# Patient Record
Sex: FEMALE | Race: WHITE | Employment: UNEMPLOYED | URBAN - METROPOLITAN AREA
[De-identification: names, ages, dates, MRNs, and addresses within clinical notes are randomized per-mention and may not be internally consistent; named-entity substitution may affect disease eponyms.]

---

## 2017-05-22 PROBLEM — Z86.32 HX GESTATIONAL DIABETES: Status: ACTIVE | Noted: 2017-05-22

## 2017-05-22 PROBLEM — R79.89 ABNORMAL LIVER FUNCTION TEST: Status: ACTIVE | Noted: 2017-05-22

## 2017-05-22 NOTE — PATIENT INSTRUCTIONS
Check on tetanus shot see if you had one in the last 5- 10 years- if not - then you need one (TDaP)- tetanus, diphtheria, pertussis.        Recheck blood work in 3 months (CBC, iron, TIBC, ferritin) and CMP for liver function (fasting)     Pap obtained with

## 2017-05-23 NOTE — TELEPHONE ENCOUNTER
----- Message from ZACKERY Dickey sent at 5/23/2017 12:02 PM CDT -----  Please notify patient that gonorrhea and chlamydia is negative. Thank you.   Pap and HPV are pending

## 2017-05-23 NOTE — PROGRESS NOTES
HPI:    Patient ID: Chrissy Kirk is a 29year old female. HPI patient is here for her annual physical.  Patient denies complaints, would like to refill her birth control pills. Patient has had the same partner for the last year.   Denies vaginal comp ear and ear canal normal.   Nose: Nose normal.   Mouth/Throat: Oropharynx is clear and moist and mucous membranes are normal. Normal dentition. No oropharyngeal exudate.    Eyes: Conjunctivae and lids are normal. Pupils are equal, round, and reactive to lig normal strength   Lymphadenopathy:     She has no cervical adenopathy. She has no axillary adenopathy. Right: No inguinal and no supraclavicular adenopathy present. Left: No inguinal and no supraclavicular adenopathy present.    Neurologic

## 2017-05-24 NOTE — TELEPHONE ENCOUNTER
----- Message from ZACKERY Mac sent at 5/24/2017 12:08 PM CDT -----  Please notify patient that her Pap smear is within normal limits, and HPV is negative. .  Recommend annual physical, will discuss need for Pap at physical next year.   It does look

## 2017-05-24 NOTE — TELEPHONE ENCOUNTER
Patient notified of results and recommendations, expressed understanding and thanks. Patient states she is not having any symptoms of yeast infection.

## 2017-10-26 NOTE — TELEPHONE ENCOUNTER
----- Message from ZACKERY Colón sent at 10/26/2017  9:00 AM CDT -----  Please notify patient that her blood work shows that her total iron is normal, however her iron saturation is a little low–basically she could have more iron as a backup–please

## 2018-06-11 NOTE — PROGRESS NOTES
HPI:    Patient ID: Vic Henson is a 34year old female. HPI patient is here for her annual physical and Pap, states that she stopped her birth control pills 2 weeks ago, is planning to become pregnant.   Patient has had the pain same partner for the Eyes: Conjunctivae and lids are normal. Pupils are equal, round, and reactive to light. Right eye exhibits no discharge. Left eye exhibits no discharge. Neck: Trachea normal and normal range of motion. Neck supple. No tracheal deviation present.  No thyro Neurological: She is alert and oriented to person, place, and time. She has normal strength. Skin: Skin is warm, dry and intact. No cyanosis. Nails show no clubbing. Psychiatric: She has a normal mood and affect.  Her behavior is normal. Judgment and th

## 2018-06-11 NOTE — PATIENT INSTRUCTIONS
Start prenatal vitamins now, you may continue exercise,  but monitor heart rate. Keep your heart rate below 140. Be cautious not to over heat,  you  want to keep a normal core body temperature.   If you develop a fever you may only use Tylenol over the cou

## 2018-06-12 NOTE — TELEPHONE ENCOUNTER
Patient notified of results and recommendations and expressed understanding.   Patient states she is currently taking iron supplement  Patient advised to stop iron supplement  Patient states she is not taking any calcium supplements  Order for [facility] faxed to

## 2018-06-12 NOTE — TELEPHONE ENCOUNTER
----- Message from ZACKERY Contreras sent at 6/12/2018  1:25 PM CDT -----  Please notify patient that her hemoglobin is good, she is not anemic. Her iron is actually a little bit high.   If she is taking extra iron on top of a multivitamin, she can stop

## 2018-06-13 NOTE — TELEPHONE ENCOUNTER
----- Message from ZACKERY Gillette sent at 6/13/2018 12:19 PM CDT -----  Please notify patient that her Pap smear is within normal limits, her HPV is negative. Recommend annual physical, will discuss need for Pap at physical next year.   (Patient was

## 2018-07-21 NOTE — TELEPHONE ENCOUNTER
----- Message from Jerald Gonzalez MD sent at 7/21/2018 11:21 AM CDT -----  Della's patient.  Please inform patient that PTH is normal.

## 2019-06-12 NOTE — PATIENT INSTRUCTIONS
Follow up for fasting blood work and B 12     Recommend HPV vaccine - Gardasil-9 -  Series of 3 first, then #2 in 2 months, then #3 in 6 months.      Tetanus shot today (TDaP)    Pap and HPV today     Start prenatal vitamins now, you may continue exercise,

## 2019-06-12 NOTE — PROGRESS NOTES
HPI:    Patient ID: Gregor Miranda is a 30year old female. HPI patient is here for her annual physical.  Patient states that overall she feels well–states her menses have been slightly irregular.   States she still gets one every month, but sometimes is normal range of motion. Neck supple. No tracheal deviation present. No thyroid mass and no thyromegaly present. Cardiovascular: Normal rate, regular rhythm, S1 normal, S2 normal, normal heart sounds, intact distal pulses and normal pulses.    No murmur he affect. Her behavior is normal. Judgment and thought content normal.   Nursing note and vitals reviewed.              ASSESSMENT/PLAN:   Encounter for health maintenance examination in adult  (primary encounter diagnosis)  Encounter for gynecological examin

## 2019-06-17 NOTE — TELEPHONE ENCOUNTER
----- Message from WILMER Jackson sent at 6/17/2019  2:13 PM CDT -----  Please notify patient that her Pap smear is within normal limits. Recommend annual physical, will discuss need for Pap at physical next year. Thank you.

## 2019-07-03 NOTE — TELEPHONE ENCOUNTER
Informed pt of her blood work results. Iron and TIBC was also added to blood work. Pt expressed understanding and will call with iron returns when they come in. Pt will also watch her diet.

## 2019-07-03 NOTE — TELEPHONE ENCOUNTER
----- Message from WILMER sIlas sent at 7/3/2019 12:17 PM CDT -----  Please notify patient that her CBC shows that she is slightly anemic–it looks to be an iron deficiency –order to  add iron profile .   Her B12 level is normal.  Her cholesterol is

## 2020-06-23 NOTE — PATIENT INSTRUCTIONS
Pap obtained today with HPV test - results will be back in a few days. Follow up for fasting blood work at [employer] on [employer] loss, health diet high in vegetables and lean proteins, and aim for a waist circumference of 35 inches or less.

## 2020-06-23 NOTE — PROGRESS NOTES
HPI:    Patient ID: Crow Rojo is a 37year old female. HPI patient presents today for her annual physical–states overall she feels well.   Patient states she is no longer taking oral contraceptive pill–she would like to become pregnant in the future and 2+ on the left side. Posterior tibial pulses are 2+ on the right side and 2+ on the left side.    Pulmonary/Chest: Effort normal and breath sounds normal. Right breast exhibits no inverted nipple, no mass, no nipple discharge, no skin change and Thin Prep Collect      CBC W Differential W Platelet [E]      Comp Metabolic Panel (14) [E]      T4 FREE [866] [Q]      TSH [899] [Q]      Vitamin B12 [E]      Vitamin D, 25-Hydroxy [E]      Lipid Panel [E]      SARS-CoV-2 IGG ANTIBODY (COVID) [33850][Q]

## 2020-06-25 NOTE — TELEPHONE ENCOUNTER
----- Message from WILMER Hughes sent at 6/25/2020  4:22 PM CDT -----  Please notify patient that her Pap smear is within normal limits and HPV is negative. Recommend annual physical, will discuss need for Pap at physical next year. Thank you.

## 2020-06-29 NOTE — TELEPHONE ENCOUNTER
----- Message from WILMER Bartholomew sent at 6/29/2020  1:22 PM CDT -----  Please notify patient that her blood work shows that she is anemic. Her hemoglobin is 10. 2–her CBC looks like iron deficiency.   Please call [facility] to see if iron tests can be ad

## 2020-06-29 NOTE — TELEPHONE ENCOUNTER
Called to Hermes Smith with [facility]. Labs added as requested. Patient informed of the below results and recommendations. Patient will await further recommendations once add on testing results are available.

## 2020-06-30 NOTE — TELEPHONE ENCOUNTER
Pt notified and verbalized understanding. Orders faxed to [facility] at [phone].

## 2020-06-30 NOTE — TELEPHONE ENCOUNTER
----- Message from WILMER Serna sent at 6/30/2020 11:03 AM CDT -----  Please notify patient that her iron is low. This is the cause of her anemia.   I recommend iron supplement–prescription for Feosol 325 mg 1 by mouth in the morning with breakfas

## 2020-08-14 NOTE — TELEPHONE ENCOUNTER
Received letter regarding overdue lab orders. Patient said she was not due for the labs until the end of September. She will give [facility] a call to schedule that appt for then.

## 2021-06-24 NOTE — PROGRESS NOTES
HPI/Subjective:   Patient ID: Andres Ballesteros is a 31year old female. HPI patient presents the office today for her annual physical.  Patient states she feels well overall  Chart review shows history of low iron, low vitamin D, and gestational diabetes. Dentition: Normal dentition. Pharynx: No oropharyngeal exudate. Eyes:      General: Lids are normal.         Right eye: No discharge. Left eye: No discharge.       Conjunctiva/sclera: Conjunctivae normal.      Pupils: Pupils are equal, round, adenopathy. Left upper body: No supraclavicular adenopathy. Skin:     General: Skin is warm and dry. Nails: There is no clubbing. Neurological:      Mental Status: She is alert and oriented to person, place, and time.    Psychiatric:         A severe cramping and/or bleeding. Call OB/GYN office to schedule an appointment for further care.

## 2021-06-24 NOTE — PATIENT INSTRUCTIONS
Follow-up for fasting blood work - [facility]     Pap smear and HPV test obtained today–results be back within a week and will notify you. Start prenatal vitamins now, you may continue exercise,  but monitor heart rate. Keep your heart rate below 140.  Be ca

## 2021-06-29 NOTE — TELEPHONE ENCOUNTER
----- Message from WILMER Johnson sent at 6/29/2021  3:18 PM CDT -----  Please make sure patient receives my chart message as below  Your Pap smear is normal and your HPV is negative.   Recommend an annual physical and we can discuss need for the Pap

## 2022-11-10 NOTE — TELEPHONE ENCOUNTER
Had a baby on 22.  Daughter see's Dr. Karyn Tomas and wants to know if Dr. Karyn Tomas will see her  also

## 2023-04-10 ENCOUNTER — OFFICE VISIT (OUTPATIENT)
Dept: FAMILY MEDICINE CLINIC | Facility: CLINIC | Age: 38
End: 2023-04-10
Payer: COMMERCIAL

## 2023-04-10 VITALS
SYSTOLIC BLOOD PRESSURE: 110 MMHG | OXYGEN SATURATION: 98 % | HEART RATE: 87 BPM | RESPIRATION RATE: 18 BRPM | TEMPERATURE: 98 F | HEIGHT: 63 IN | WEIGHT: 236 LBS | BODY MASS INDEX: 41.82 KG/M2 | DIASTOLIC BLOOD PRESSURE: 72 MMHG

## 2023-04-10 DIAGNOSIS — M25.532 PAIN IN BOTH WRISTS: Primary | ICD-10-CM

## 2023-04-10 DIAGNOSIS — Z86.32 HX GESTATIONAL DIABETES: ICD-10-CM

## 2023-04-10 DIAGNOSIS — G56.03 BILATERAL CARPAL TUNNEL SYNDROME: ICD-10-CM

## 2023-04-10 DIAGNOSIS — R20.2 PARESTHESIA: ICD-10-CM

## 2023-04-10 DIAGNOSIS — D50.9 IRON DEFICIENCY ANEMIA, UNSPECIFIED IRON DEFICIENCY ANEMIA TYPE: ICD-10-CM

## 2023-04-10 DIAGNOSIS — M25.531 PAIN IN BOTH WRISTS: Primary | ICD-10-CM

## 2023-04-10 PROCEDURE — 3074F SYST BP LT 130 MM HG: CPT | Performed by: FAMILY MEDICINE

## 2023-04-10 PROCEDURE — 3051F HG A1C>EQUAL 7.0%<8.0%: CPT | Performed by: FAMILY MEDICINE

## 2023-04-10 PROCEDURE — 3008F BODY MASS INDEX DOCD: CPT | Performed by: FAMILY MEDICINE

## 2023-04-10 PROCEDURE — 99214 OFFICE O/P EST MOD 30 MIN: CPT | Performed by: FAMILY MEDICINE

## 2023-04-10 PROCEDURE — 3078F DIAST BP <80 MM HG: CPT | Performed by: FAMILY MEDICINE

## 2023-04-10 RX ORDER — IBUPROFEN 600 MG/1
TABLET ORAL
COMMUNITY
Start: 2022-11-09 | End: 2023-04-10

## 2023-04-10 RX ORDER — IBUPROFEN 200 MG
400 TABLET ORAL 2 TIMES DAILY
COMMUNITY
Start: 2023-04-10

## 2023-04-10 NOTE — PATIENT INSTRUCTIONS
Rec wrist braces bilaterally    Rec fasting labs    Rec ibuprophen 2 tab am and pm with food    Rec annual check  in future

## 2023-04-11 ENCOUNTER — TELEPHONE (OUTPATIENT)
Dept: FAMILY MEDICINE CLINIC | Facility: CLINIC | Age: 38
End: 2023-04-11

## 2023-04-11 DIAGNOSIS — Z86.32 HISTORY OF GESTATIONAL DIABETES: ICD-10-CM

## 2023-04-11 DIAGNOSIS — E11.9 NEW ONSET TYPE 2 DIABETES MELLITUS (HCC): Primary | ICD-10-CM

## 2023-04-11 DIAGNOSIS — R74.8 ELEVATED LIVER ENZYMES: Primary | ICD-10-CM

## 2023-04-11 NOTE — TELEPHONE ENCOUNTER
Pt contacted-  All recommendations reviewed with pt. Pt agreed to call diabetes education for appt. Referral order pended. Pt agreed to add iron daily. Pt scheduled appt for ultrasound. [facility] contacted-  Added Hepatitis panel- pending. Pt mentioned she was testing four times a day when she was pregnant-   Please advise on frequency in checking blood sugars at this time. F/u appt scheduled 4/18/23.     Future Appointments   Date Time Provider Brandon Servin   4/12/2023 10:30 AM SY US RM 1 SY US [place]   4/18/2023 10:00 AM Neris Bacon MD [facility] [place] [facility]

## 2023-04-11 NOTE — TELEPHONE ENCOUNTER
Pt informed. Referral with lab report faxed to [place] to diabetes education. Pt agreed to call for appt. Pt agreed to recommendations given.

## 2023-04-11 NOTE — TELEPHONE ENCOUNTER
Referral to diabetic education faxed today. Spoke with Filemon Culp at [facility] diabetic education office- referral was received. Filemon Culp stated she would call pt to schedule appt.

## 2023-04-11 NOTE — TELEPHONE ENCOUNTER
----- Message from Rachael Burch MD sent at 4/11/2023  8:39 AM CDT -----  Laboratory results reviewed. Laboratories showing CBC abnormal with slight anemia hemoglobin of 11. 1. Iron panel with low iron and iron saturation. Chemistry profile shows glucose elevated at 108. Kidney function normal.  Electrolytes normal.  Liver enzymes elevated. Lipid profile showing slight elevation of total cholesterol at 213 triglycerides 157 LDL cholesterol 137. Patient recommended iron supplement 325 mg daily. Hemoglobin A1c 7.3 elevated consistent with diabetes. Urinalysis with a few skin cells otherwise negative. Thyroid function shows decreased TSH but free T4. B12 is normal.    Patient recommended referral to diabetic education and appointment for follow-up to discuss diagnosis and treatment of diabetes. Patient to restart checking home glucose as she did during  previous pregnancy. Noted liver enzymes to be further evaluated with liver ultrasound and hepatitis blood panel. Patient to avoid alcohol and Tylenol usage. Patient appointment advised to discuss lab results and treatments. Results forwarded to patient via [employer] system. Patient encouraged to call for any questions or concerns.

## 2023-04-12 ENCOUNTER — HOSPITAL ENCOUNTER (OUTPATIENT)
Dept: ULTRASOUND IMAGING | Age: 38
Discharge: HOME | End: 2023-04-12
Attending: FAMILY MEDICINE
Payer: COMMERCIAL

## 2023-04-12 ENCOUNTER — TELEPHONE (OUTPATIENT)
Dept: FAMILY MEDICINE CLINIC | Facility: CLINIC | Age: 38
End: 2023-04-12

## 2023-04-12 DIAGNOSIS — R74.8 ELEVATED LIVER ENZYMES: ICD-10-CM

## 2023-04-12 LAB
% SATURATION: 8 % (CALC) (ref 16–45)
ABSOLUTE BASOPHILS: 40 CELLS/UL (ref 0–200)
ABSOLUTE EOSINOPHILS: 240 CELLS/UL (ref 15–500)
ABSOLUTE LYMPHOCYTES: 1912 CELLS/UL (ref 850–3900)
ABSOLUTE MONOCYTES: 592 CELLS/UL (ref 200–950)
ABSOLUTE NEUTROPHILS: 5216 CELLS/UL (ref 1500–7800)
ALBUMIN/GLOBULIN RATIO: 1.5 (CALC) (ref 1–2.5)
ALBUMIN: 4.5 G/DL (ref 3.6–5.1)
ALKALINE PHOSPHATASE: 99 U/L (ref 31–125)
ALT: 145 U/L (ref 6–29)
AST: 166 U/L (ref 10–30)
BASOPHILS: 0.5 %
BILIRUBIN, TOTAL: 0.8 MG/DL (ref 0.2–1.2)
BILIRUBIN: NEGATIVE
BUN: 9 MG/DL (ref 7–25)
CALCIUM: 9.6 MG/DL (ref 8.6–10.2)
CARBON DIOXIDE: 29 MMOL/L (ref 20–32)
CHLORIDE: 100 MMOL/L (ref 98–110)
CHOL/HDLC RATIO: 4.5 (CALC)
CHOLESTEROL, TOTAL: 213 MG/DL
COLOR: YELLOW
CREATININE: 0.65 MG/DL (ref 0.5–0.97)
EGFR: 117 ML/MIN/1.73M2
EOSINOPHILS: 3 %
FERRITIN: 30 NG/ML (ref 16–154)
GLOBULIN: 3.1 G/DL (CALC) (ref 1.9–3.7)
GLUCOSE: 108 MG/DL (ref 65–99)
GLUCOSE: NEGATIVE
HDL CHOLESTEROL: 47 MG/DL
HEMATOCRIT: 33.8 % (ref 35–45)
HEMOGLOBIN A1C: 7.3 % OF TOTAL HGB
HEMOGLOBIN: 11.1 G/DL (ref 11.7–15.5)
IRON BINDING CAPACITY: 461 MCG/DL (CALC) (ref 250–450)
IRON, TOTAL: 37 MCG/DL (ref 40–190)
KETONES: NEGATIVE
LDL-CHOLESTEROL: 137 MG/DL (CALC)
LEUKOCYTE ESTERASE: NEGATIVE
LYMPHOCYTES: 23.9 %
MCH: 26.7 PG (ref 27–33)
MCHC: 32.8 G/DL (ref 32–36)
MCV: 81.4 FL (ref 80–100)
MONOCYTES: 7.4 %
MPV: 9.8 FL (ref 7.5–12.5)
NEUTROPHILS: 65.2 %
NITRITE: NEGATIVE
NON-HDL CHOLESTEROL: 166 MG/DL (CALC)
OCCULT BLOOD: NEGATIVE
PH: 5.5 (ref 5–8)
PLATELET COUNT: 383 THOUSAND/UL (ref 140–400)
POTASSIUM: 4 MMOL/L (ref 3.5–5.3)
PROTEIN, TOTAL: 7.6 G/DL (ref 6.1–8.1)
PROTEIN: NEGATIVE
RDW: 13.9 % (ref 11–15)
RED BLOOD CELL COUNT: 4.15 MILLION/UL (ref 3.8–5.1)
SIGNAL TO CUT-OFF: 0.04
SODIUM: 138 MMOL/L (ref 135–146)
SPECIFIC GRAVITY: 1.02 (ref 1–1.03)
T4, FREE: 1 NG/DL (ref 0.8–1.8)
TRIGLYCERIDES: 157 MG/DL
TSH: 0.37 MIU/L
VITAMIN B12: 1072 PG/ML (ref 200–1100)
WHITE BLOOD CELL COUNT: 8 THOUSAND/UL (ref 3.8–10.8)

## 2023-04-12 PROCEDURE — 76700 US EXAM ABDOM COMPLETE: CPT | Performed by: FAMILY MEDICINE

## 2023-04-12 NOTE — TELEPHONE ENCOUNTER
----- Message from Leala Najjar, MD sent at 4/12/2023  2:33 PM CDT -----  Renal ultrasound shows fatty deposit changes of the liver. Also distended gallbladder. Further evaluation of gallbladder pending information regarding patient's dietary habits and the 12 hours prior to the ultrasound. To review and discuss with her further at upcoming appointment. Results forwarded to patient via [employer] system. Patient encouraged to call for any questions or concerns.

## 2023-04-12 NOTE — TELEPHONE ENCOUNTER
Dictated comments regarding abdominal ultrasound have dictation error in the dictation:      Report should reference abdominal ultrasound showing fatty deposits in her liver. Patient's gallbladder is also distended. Distention of the gallbladder may be related to patient being fasting versus gallbladder dysfunction. Please check with patient regarding when she ate prior to ultrasound being performed. We will discuss ultrasound results further with patient at upcoming appointment. Further evaluation of gallbladder may or may not be needed.

## 2023-04-12 NOTE — TELEPHONE ENCOUNTER
Pt states she ate cereal at 4am and had ultrasound at 10:30am.    Pt will await further discussion at upcoming appt next week.

## 2023-04-18 ENCOUNTER — OFFICE VISIT (OUTPATIENT)
Dept: FAMILY MEDICINE CLINIC | Facility: CLINIC | Age: 38
End: 2023-04-18
Payer: COMMERCIAL

## 2023-04-18 VITALS
TEMPERATURE: 97 F | HEART RATE: 90 BPM | BODY MASS INDEX: 41.12 KG/M2 | OXYGEN SATURATION: 97 % | RESPIRATION RATE: 16 BRPM | HEIGHT: 63.5 IN | DIASTOLIC BLOOD PRESSURE: 76 MMHG | SYSTOLIC BLOOD PRESSURE: 114 MMHG | WEIGHT: 235 LBS

## 2023-04-18 DIAGNOSIS — R93.5 ABNORMAL ABDOMINAL ULTRASOUND: ICD-10-CM

## 2023-04-18 DIAGNOSIS — G56.03 BILATERAL CARPAL TUNNEL SYNDROME: ICD-10-CM

## 2023-04-18 DIAGNOSIS — E11.9 NEW ONSET TYPE 2 DIABETES MELLITUS (HCC): Primary | ICD-10-CM

## 2023-04-18 DIAGNOSIS — R79.89 ABNORMAL LIVER FUNCTION TEST: ICD-10-CM

## 2023-04-18 DIAGNOSIS — D50.9 IRON DEFICIENCY ANEMIA, UNSPECIFIED IRON DEFICIENCY ANEMIA TYPE: ICD-10-CM

## 2023-04-18 PROBLEM — Z86.32 HX GESTATIONAL DIABETES: Status: RESOLVED | Noted: 2017-05-22 | Resolved: 2023-04-18

## 2023-04-18 PROCEDURE — 3008F BODY MASS INDEX DOCD: CPT | Performed by: FAMILY MEDICINE

## 2023-04-18 PROCEDURE — 99214 OFFICE O/P EST MOD 30 MIN: CPT | Performed by: FAMILY MEDICINE

## 2023-04-18 PROCEDURE — 3074F SYST BP LT 130 MM HG: CPT | Performed by: FAMILY MEDICINE

## 2023-04-18 PROCEDURE — 3078F DIAST BP <80 MM HG: CPT | Performed by: FAMILY MEDICINE

## 2023-04-18 NOTE — PATIENT INSTRUCTIONS
Rec continue monitor sugars, diet and goal weight loss    Recheck labs 3 months    Check HIDDA scan - eval gallbladder    Start treatment diabetes    Recheck 6 weeks    Monitor thyroid and cholesterol

## 2023-04-28 ENCOUNTER — TELEPHONE (OUTPATIENT)
Dept: FAMILY MEDICINE CLINIC | Facility: CLINIC | Age: 38
End: 2023-04-28

## 2023-04-28 NOTE — TELEPHONE ENCOUNTER
GB scan completed at [facility] on 4/27/23  Report was reviewed per CR-  Reported as being negative. Pt to follow up if concerns,  appt noted on file    Pt was informed. Pt agreed to f/u at appt.     Future Appointments   Date Time Provider Brandon Servin   5/31/2023 11:00 AM Stephen Jaimes MD [facility] [place] [facility] [place]

## 2023-05-31 ENCOUNTER — OFFICE VISIT (OUTPATIENT)
Dept: FAMILY MEDICINE CLINIC | Facility: CLINIC | Age: 38
End: 2023-05-31
Payer: COMMERCIAL

## 2023-05-31 VITALS
BODY MASS INDEX: 40.42 KG/M2 | SYSTOLIC BLOOD PRESSURE: 118 MMHG | TEMPERATURE: 98 F | WEIGHT: 231 LBS | HEIGHT: 63.5 IN | RESPIRATION RATE: 16 BRPM | OXYGEN SATURATION: 98 % | DIASTOLIC BLOOD PRESSURE: 82 MMHG | HEART RATE: 92 BPM

## 2023-05-31 DIAGNOSIS — E11.9 NEW ONSET TYPE 2 DIABETES MELLITUS (HCC): Primary | ICD-10-CM

## 2023-05-31 DIAGNOSIS — E55.9 VITAMIN D DEFICIENCY: ICD-10-CM

## 2023-05-31 DIAGNOSIS — R79.89 ABNORMAL LIVER FUNCTION TEST: ICD-10-CM

## 2023-05-31 DIAGNOSIS — E53.8 VITAMIN B 12 DEFICIENCY: ICD-10-CM

## 2023-05-31 DIAGNOSIS — R20.2 PARESTHESIA: ICD-10-CM

## 2023-05-31 DIAGNOSIS — Z00.00 ENCOUNTER FOR HEALTH MAINTENANCE EXAMINATION IN ADULT: ICD-10-CM

## 2023-05-31 DIAGNOSIS — G56.03 BILATERAL CARPAL TUNNEL SYNDROME: ICD-10-CM

## 2023-05-31 PROCEDURE — 99214 OFFICE O/P EST MOD 30 MIN: CPT | Performed by: FAMILY MEDICINE

## 2023-05-31 PROCEDURE — 3008F BODY MASS INDEX DOCD: CPT | Performed by: FAMILY MEDICINE

## 2023-05-31 PROCEDURE — 3079F DIAST BP 80-89 MM HG: CPT | Performed by: FAMILY MEDICINE

## 2023-05-31 PROCEDURE — 3074F SYST BP LT 130 MM HG: CPT | Performed by: FAMILY MEDICINE

## 2023-05-31 RX ORDER — ACETAMINOPHEN AND CODEINE PHOSPHATE 120; 12 MG/5ML; MG/5ML
0.35 SOLUTION ORAL DAILY
COMMUNITY
Start: 2023-05-12

## 2023-05-31 NOTE — PATIENT INSTRUCTIONS
Rec yearly eye exam    Rec fasting labs in July    Appt to follow for \" physical\"  Continue plan to see gyne    Increase metformin to 500mg in am, 1000 in pm    Consider pneumonia vaccine

## 2023-06-01 PROBLEM — M25.532 PAIN IN BOTH WRISTS: Status: RESOLVED | Noted: 2023-04-10 | Resolved: 2023-06-01

## 2023-06-01 PROBLEM — M25.531 PAIN IN BOTH WRISTS: Status: RESOLVED | Noted: 2023-04-10 | Resolved: 2023-06-01

## 2023-06-01 PROBLEM — R20.2 PARESTHESIA: Status: RESOLVED | Noted: 2023-04-10 | Resolved: 2023-06-01

## 2023-06-02 ENCOUNTER — TELEPHONE (OUTPATIENT)
Dept: FAMILY MEDICINE CLINIC | Facility: CLINIC | Age: 38
End: 2023-06-02

## 2023-07-19 PROCEDURE — 3061F NEG MICROALBUMINURIA REV: CPT | Performed by: NURSE PRACTITIONER

## 2023-07-19 PROCEDURE — 3044F HG A1C LEVEL LT 7.0%: CPT | Performed by: NURSE PRACTITIONER

## 2023-07-20 ENCOUNTER — OFFICE VISIT (OUTPATIENT)
Dept: FAMILY MEDICINE CLINIC | Facility: CLINIC | Age: 38
End: 2023-07-20
Payer: COMMERCIAL

## 2023-07-20 VITALS
RESPIRATION RATE: 16 BRPM | WEIGHT: 225.38 LBS | TEMPERATURE: 98 F | OXYGEN SATURATION: 98 % | DIASTOLIC BLOOD PRESSURE: 74 MMHG | SYSTOLIC BLOOD PRESSURE: 112 MMHG | HEART RATE: 81 BPM | BODY MASS INDEX: 39.44 KG/M2 | HEIGHT: 63.5 IN

## 2023-07-20 DIAGNOSIS — M79.605 LEFT LEG PAIN: Primary | ICD-10-CM

## 2023-07-20 LAB
ABSOLUTE BASOPHILS: 40 CELLS/UL (ref 0–200)
ABSOLUTE EOSINOPHILS: 248 CELLS/UL (ref 15–500)
ABSOLUTE LYMPHOCYTES: 2336 CELLS/UL (ref 850–3900)
ABSOLUTE MONOCYTES: 693 CELLS/UL (ref 200–950)
ABSOLUTE NEUTROPHILS: 6584 CELLS/UL (ref 1500–7800)
ALBUMIN/GLOBULIN RATIO: 1.4 (CALC) (ref 1–2.5)
ALBUMIN: 4.5 G/DL (ref 3.6–5.1)
ALKALINE PHOSPHATASE: 73 U/L (ref 31–125)
ALT: 145 U/L (ref 6–29)
AST: 115 U/L (ref 10–30)
BASOPHILS: 0.4 %
BILIRUBIN, TOTAL: 0.6 MG/DL (ref 0.2–1.2)
BILIRUBIN: NEGATIVE
BUN: 9 MG/DL (ref 7–25)
CALCIUM: 9.9 MG/DL (ref 8.6–10.2)
CARBON DIOXIDE: 26 MMOL/L (ref 20–32)
CHLORIDE: 102 MMOL/L (ref 98–110)
CHOL/HDLC RATIO: 4.4 (CALC)
CHOLESTEROL, TOTAL: 195 MG/DL
CREATININE, RANDOM URINE: 360 MG/DL (ref 20–275)
CREATININE: 0.73 MG/DL (ref 0.5–0.97)
EGFR: 109 ML/MIN/1.73M2
EOSINOPHILS: 2.5 %
GLOBULIN: 3.2 G/DL (CALC) (ref 1.9–3.7)
GLUCOSE: 94 MG/DL (ref 65–99)
GLUCOSE: NEGATIVE
HDL CHOLESTEROL: 44 MG/DL
HEMATOCRIT: 36.2 % (ref 35–45)
HEMOGLOBIN A1C: 6.5 % OF TOTAL HGB
HEMOGLOBIN: 11.6 G/DL (ref 11.7–15.5)
KETONES: NEGATIVE
LDL-CHOLESTEROL: 123 MG/DL (CALC)
LEUKOCYTE ESTERASE: NEGATIVE
LYMPHOCYTES: 23.6 %
MCH: 25.6 PG (ref 27–33)
MCHC: 32 G/DL (ref 32–36)
MCV: 79.7 FL (ref 80–100)
MICROALBUMIN/CREATININE RATIO, RANDOM URINE: 8 MCG/MG CREAT
MICROALBUMIN: 2.8 MG/DL
MONOCYTES: 7 %
MPV: 10 FL (ref 7.5–12.5)
NEUTROPHILS: 66.5 %
NITRITE: NEGATIVE
NON-HDL CHOLESTEROL: 151 MG/DL (CALC)
PH: 5.5 (ref 5–8)
PLATELET COUNT: 345 THOUSAND/UL (ref 140–400)
POTASSIUM: 4.9 MMOL/L (ref 3.5–5.3)
PROTEIN, TOTAL: 7.7 G/DL (ref 6.1–8.1)
RDW: 14.8 % (ref 11–15)
RED BLOOD CELL COUNT: 4.54 MILLION/UL (ref 3.8–5.1)
SODIUM: 139 MMOL/L (ref 135–146)
SPECIFIC GRAVITY: 1.03 (ref 1–1.03)
TRIGLYCERIDES: 161 MG/DL
TSH W/REFLEX TO FT4: 0.41 MIU/L
VITAMIN B12: 702 PG/ML (ref 200–1100)
VITAMIN D, 25-OH, TOTAL: 32 NG/ML (ref 30–100)
WHITE BLOOD CELL COUNT: 9.9 THOUSAND/UL (ref 3.8–10.8)

## 2023-07-20 PROCEDURE — 3008F BODY MASS INDEX DOCD: CPT | Performed by: NURSE PRACTITIONER

## 2023-07-20 PROCEDURE — 99213 OFFICE O/P EST LOW 20 MIN: CPT | Performed by: NURSE PRACTITIONER

## 2023-07-20 PROCEDURE — 3074F SYST BP LT 130 MM HG: CPT | Performed by: NURSE PRACTITIONER

## 2023-07-20 PROCEDURE — 3078F DIAST BP <80 MM HG: CPT | Performed by: NURSE PRACTITIONER

## 2023-07-20 RX ORDER — NAPROXEN 500 MG/1
500 TABLET ORAL 2 TIMES DAILY WITH MEALS
Qty: 50 TABLET | Refills: 0 | Status: SHIPPED | OUTPATIENT
Start: 2023-07-20

## 2023-07-20 NOTE — PATIENT INSTRUCTIONS
Warm, moist compresses for 15 minutes three times a day     No climbing ladders for the next week. Take naproxen with good. Follow up if not improving or worsens.

## 2023-07-24 ENCOUNTER — TELEPHONE (OUTPATIENT)
Dept: FAMILY MEDICINE CLINIC | Facility: CLINIC | Age: 38
End: 2023-07-24

## 2023-07-24 NOTE — TELEPHONE ENCOUNTER
Called [facility]- not able to add Iron/TIBC or Ferritin (out of stability range per [facility] representative)    Pt contacted. Pt has not taken any iron supplement recently. Pt is not interested in hepatology referral at this time. Pt mentioned she was seen recently and given Naproxen for leg pain- pt has been taking BID. Pt was seen on 7/20 per CS  Pt asked what she can take.

## 2023-07-24 NOTE — TELEPHONE ENCOUNTER
----- Message from WILMER Valdes sent at 7/24/2023 12:09 PM CDT -----  Please call [facility] to add total iron and ferritin to existing blood work-orders entered. Please make sure patient receives my chart message as below-     Dr. Kayla Collazo is out of the office-I reviewed your blood work. Your hemoglobin is still slightly low at 11.6 (normal is 11.7-15.5)-add iron to your blood work as it looks to be an iron deficiency anemia are you taking any iron supplements? Your liver function is elevated-although, it is improved from previous. Reviewed ultrasound of your liver shows fatty liver infiltrate. I recommend diet high in vegetables and lean proteins low in carbohydrates and sugars. I also  recommend regular exercise and a goal of a  waist circumference of 35 inches or less at the [facility]. -Recommend repeating labs in 3 months-consider hepatology referral.  Your kidney function is normal, cholesterol is at the top of normal at 195-this is improved from previous when it was 213. HDLs (good cholesterol) is a little low at 44, LDLs (bad cholesterol) is a little high at 123-slightly improved from previous at 137, triglycerides are high at 161-slightly worse than previous at 157. Your hemoglobin A1c has improved it is now 6.5-previously was 7. 3-this means your blood sugars are in control. Your urine creatinine is slightly elevated-microalbumin is normal-make sure you are drinking plenty of fluids and avoiding ibuprofen and Aleve. Thyroid is normal, vitamin D is low normal at 32-recommend increasing vitamin D supplement by 2000 units daily (do not take more than 5000 units daily).   Vitamin B12 is normal.  Thank you,   WILMER Valdes, FNP-BC

## 2023-07-24 NOTE — TELEPHONE ENCOUNTER
Please let patient know there were not able to add iron labs-would recommend she has a blood draw at [address] [place] [address] for iron. As far as the naproxen-she should try to get off of this as soon as possible as it is filtered through her kidneys. Typically, we would recommend Tylenol, however, that goes through the liver. Would recommend trying to get off pain medicines as soon as possible.

## 2023-07-25 RX ORDER — MULTIVIT-MIN/IRON/FOLIC ACID/K 18-600-40
2000 CAPSULE ORAL DAILY
COMMUNITY

## 2023-08-11 ENCOUNTER — OFFICE VISIT (OUTPATIENT)
Dept: FAMILY MEDICINE CLINIC | Facility: CLINIC | Age: 38
End: 2023-08-11
Payer: COMMERCIAL

## 2023-08-11 VITALS
RESPIRATION RATE: 18 BRPM | HEIGHT: 64.2 IN | DIASTOLIC BLOOD PRESSURE: 78 MMHG | SYSTOLIC BLOOD PRESSURE: 118 MMHG | OXYGEN SATURATION: 99 % | WEIGHT: 224.63 LBS | HEART RATE: 94 BPM | BODY MASS INDEX: 38.35 KG/M2 | TEMPERATURE: 98 F

## 2023-08-11 DIAGNOSIS — E11.9 CONTROLLED TYPE 2 DIABETES MELLITUS WITHOUT COMPLICATION, WITHOUT LONG-TERM CURRENT USE OF INSULIN (HCC): ICD-10-CM

## 2023-08-11 DIAGNOSIS — S76.212D STRAIN OF GROIN, LEFT, SUBSEQUENT ENCOUNTER: ICD-10-CM

## 2023-08-11 DIAGNOSIS — D50.9 IRON DEFICIENCY ANEMIA, UNSPECIFIED IRON DEFICIENCY ANEMIA TYPE: ICD-10-CM

## 2023-08-11 DIAGNOSIS — R79.89 ABNORMAL LIVER FUNCTION TEST: ICD-10-CM

## 2023-08-11 DIAGNOSIS — Z00.00 ANNUAL PHYSICAL EXAM: Primary | ICD-10-CM

## 2023-08-11 PROBLEM — G56.03 BILATERAL CARPAL TUNNEL SYNDROME: Status: RESOLVED | Noted: 2023-04-10 | Resolved: 2023-08-11

## 2023-08-11 PROCEDURE — 3078F DIAST BP <80 MM HG: CPT | Performed by: FAMILY MEDICINE

## 2023-08-11 PROCEDURE — 3074F SYST BP LT 130 MM HG: CPT | Performed by: FAMILY MEDICINE

## 2023-08-11 PROCEDURE — 99395 PREV VISIT EST AGE 18-39: CPT | Performed by: FAMILY MEDICINE

## 2023-08-11 PROCEDURE — 99213 OFFICE O/P EST LOW 20 MIN: CPT | Performed by: FAMILY MEDICINE

## 2023-08-11 PROCEDURE — 3008F BODY MASS INDEX DOCD: CPT | Performed by: FAMILY MEDICINE

## 2023-08-11 NOTE — PATIENT INSTRUCTIONS
Rec pneumoia vaccine in future (post pregnancy)      Ob/ gyne care- to continue    Continue diabetic care endocrine    Rec PT for left thigh/ groin strain    Rec daily MVI with iron    Rec heart healthy diet    Tylenol- no more than 1000mg 2 x a day-- ok of rless  Rare use naproxen for severe pain- with food and water

## 2023-09-11 NOTE — TELEPHONE ENCOUNTER
Last Refill:5/31/2023 #135 with 1 Refill   Last Px: 8/11/2023     Future appt:    Last Appointment with provider:   8/11/2023  Last appointment at [facility]:  8/11/2023  CHOLESTEROL, TOTAL (mg/dL)   Date Value   07/19/2023 195     HDL CHOLESTEROL (mg/dL)   Date Value   07/19/2023 44 (L)     LDL-CHOLESTEROL (mg/dL (calc))   Date Value   07/19/2023 123 (H)     TRIGLYCERIDES (mg/dL)   Date Value   07/19/2023 161 (H)     Lab Results   Component Value Date    A1C 6.5 (H) 07/19/2023     Lab Results   Component Value Date    T4F 1.0 04/10/2023    TSH 0.37 (L) 04/10/2023    TSHT4 0.41 07/19/2023       No follow-ups on file.

## (undated) NOTE — LETTER
Date: 7/20/2023    Patient Name: Theresa Solis          To Whom it may concern: This letter has been written at the patient's request. The above patient was seen at the [facility] for treatment of a medical condition. The patient may return to work on 7/21 with the following limitations no climbing ladders through 7/30 for medical reasons.         Sincerely,      WILMER Clinton

## (undated) NOTE — LETTER
Date: 8/11/2023    Patient Name: Tamara Conley          To Whom it may concern: This letter has been written at the patient's request. The above patient was seen at the [facility] for treatment of a medical condition. The patient may continue working with the following limitations ---no climbing ladders through for medical reasons until 9/11/2023.       Sincerely,    Izabel Waddell MD

## (undated) NOTE — LETTER
07/12/19        Dayna Berger  [address] [facility] [address] [facility]      Dear Lopez Eason records indicate that you have outstanding lab work and or testing that was ordered for you and has not yet been completed:  Orders Placed This Encounter      CBC

## (undated) NOTE — LETTER
09/27/17        Crow Rojo  [address]      Dear Pradip Regalado records indicate that you have outstanding lab work and or testing that was ordered for you and has not yet been completed:           ThinPrep PAP Smear      Hpv D

## (undated) NOTE — MR AVS SNAPSHOT
[address]  [place] [address] [id number]  [phone]               Thank you for choosing us for your health care visit with ZACKERY Ortez.   We are glad to serve you and happy to provide you with this summary o [employer] will allow you to access patient instructions from your recent visit,  view other health information, and more. To sign up or find more information, go to [email or web address] and click on the Sign Up Now link in the [employer] box.      Enter 2 ½ hours per week – spread out over time Use a shivani to keep you motivated   Don’t forget strength training with weights and resistance Set goals and track your progress   You don’t need to join a gym. Home exercises work great.  Put more priority on exe

## (undated) NOTE — LETTER
07/12/18        Mily Messer  [address] [facility]      Dear Simin Berry records indicate that you have outstanding lab work and or testing that was ordered for you and has not yet been completed:          PTH, Intact [E]  To provide y